# Patient Record
Sex: FEMALE | HISPANIC OR LATINO | URBAN - METROPOLITAN AREA
[De-identification: names, ages, dates, MRNs, and addresses within clinical notes are randomized per-mention and may not be internally consistent; named-entity substitution may affect disease eponyms.]

---

## 2024-08-08 ENCOUNTER — APPOINTMENT (RX ONLY)
Dept: URBAN - METROPOLITAN AREA CLINIC 82 | Facility: CLINIC | Age: 49
Setting detail: DERMATOLOGY
End: 2024-08-08

## 2024-08-08 DIAGNOSIS — L85.3 XEROSIS CUTIS: ICD-10-CM

## 2024-08-08 DIAGNOSIS — L29.8 OTHER PRURITUS: ICD-10-CM

## 2024-08-08 DIAGNOSIS — L29.89 OTHER PRURITUS: ICD-10-CM

## 2024-08-08 DIAGNOSIS — L81.6 OTHER DISORDERS OF DIMINISHED MELANIN FORMATION: ICD-10-CM

## 2024-08-08 DIAGNOSIS — L30.9 DERMATITIS, UNSPECIFIED: ICD-10-CM

## 2024-08-08 DIAGNOSIS — D485 NEOPLASM OF UNCERTAIN BEHAVIOR OF SKIN: ICD-10-CM

## 2024-08-08 PROBLEM — D48.5 NEOPLASM OF UNCERTAIN BEHAVIOR OF SKIN: Status: ACTIVE | Noted: 2024-08-08

## 2024-08-08 PROCEDURE — ? PRESCRIPTION

## 2024-08-08 PROCEDURE — ? TREATMENT REGIMEN

## 2024-08-08 PROCEDURE — ? DEFER

## 2024-08-08 PROCEDURE — 99243 OFF/OP CNSLTJ NEW/EST LOW 30: CPT

## 2024-08-08 PROCEDURE — ? COUNSELING

## 2024-08-08 RX ORDER — CLOBETASOL PROPIONATE 0.25 MG/G
CREAM TOPICAL
Qty: 1 | Refills: 0 | Status: ERX | COMMUNITY
Start: 2024-08-08

## 2024-08-08 RX ORDER — PIMECROLIMUS 10 MG/G
CREAM TOPICAL
Qty: 100 | Refills: 0 | Status: ERX | COMMUNITY
Start: 2024-08-08

## 2024-08-08 RX ADMIN — PIMECROLIMUS: 10 CREAM TOPICAL at 00:00

## 2024-08-08 RX ADMIN — CLOBETASOL PROPIONATE: 0.25 CREAM TOPICAL at 00:00

## 2024-08-08 NOTE — PROCEDURE: TREATMENT REGIMEN
Initiate Treatment: clobetasol 0.025 % topical cream \\nQuantity: 1.0 Unspecified\\nSig: Apply to affected areas on legs BID every other day\\n\\nElidel 1 % topical cream \\nQuantity: 100.0 g\\nSig: Apply to hypopigmentation on legs twice daily, every other day
Detail Level: Zone

## 2024-08-08 NOTE — PROCEDURE: DEFER
Size Of Lesion In Cm (Optional): 0
Other Procedure: UV LIGHT
Detail Level: Detailed
Introduction Text (Please End With A Colon): The following procedure was deferred:

## 2024-10-03 ENCOUNTER — RX ONLY (OUTPATIENT)
Age: 49
Setting detail: RX ONLY
End: 2024-10-03

## 2024-10-03 ENCOUNTER — APPOINTMENT (RX ONLY)
Dept: URBAN - METROPOLITAN AREA CLINIC 82 | Facility: CLINIC | Age: 49
Setting detail: DERMATOLOGY
End: 2024-10-03

## 2024-10-03 DIAGNOSIS — L81.6 OTHER DISORDERS OF DIMINISHED MELANIN FORMATION: ICD-10-CM

## 2024-10-03 DIAGNOSIS — L85.3 XEROSIS CUTIS: ICD-10-CM

## 2024-10-03 PROCEDURE — ? PRESCRIPTION

## 2024-10-03 PROCEDURE — 99213 OFFICE O/P EST LOW 20 MIN: CPT

## 2024-10-03 PROCEDURE — ? COUNSELING

## 2024-10-03 PROCEDURE — ? TREATMENT REGIMEN

## 2024-10-03 RX ORDER — PIMECROLIMUS 10 MG/G
CREAM TOPICAL
Qty: 100 | Refills: 0 | Status: ERX

## 2024-10-03 RX ORDER — BETAMETHASONE DIPROPIONATE 0.5 MG/G
CREAM TOPICAL
Qty: 45 | Refills: 0 | Status: ERX | COMMUNITY
Start: 2024-10-03

## 2024-10-03 RX ADMIN — BETAMETHASONE DIPROPIONATE: 0.5 CREAM TOPICAL at 00:00

## 2024-11-07 RX ADMIN — RUXOLITINIB: 15 CREAM TOPICAL at 00:00

## 2024-11-08 ENCOUNTER — APPOINTMENT (RX ONLY)
Dept: URBAN - METROPOLITAN AREA CLINIC 82 | Facility: CLINIC | Age: 49
Setting detail: DERMATOLOGY
End: 2024-11-08

## 2024-11-08 ENCOUNTER — RX ONLY (OUTPATIENT)
Age: 49
Setting detail: RX ONLY
End: 2024-11-08

## 2024-11-08 DIAGNOSIS — D485 NEOPLASM OF UNCERTAIN BEHAVIOR OF SKIN: ICD-10-CM

## 2024-11-08 DIAGNOSIS — L81.6 OTHER DISORDERS OF DIMINISHED MELANIN FORMATION: ICD-10-CM

## 2024-11-08 DIAGNOSIS — L20.89 OTHER ATOPIC DERMATITIS: ICD-10-CM

## 2024-11-08 PROBLEM — D48.5 NEOPLASM OF UNCERTAIN BEHAVIOR OF SKIN: Status: ACTIVE | Noted: 2024-11-08

## 2024-11-08 PROCEDURE — ? COUNSELING

## 2024-11-08 PROCEDURE — ? TREATMENT REGIMEN

## 2024-11-08 PROCEDURE — 99213 OFFICE O/P EST LOW 20 MIN: CPT

## 2024-11-08 PROCEDURE — ? PRESCRIPTION

## 2024-11-08 PROCEDURE — ? DEFER

## 2024-11-08 RX ORDER — RUXOLITINIB 15 MG/G
CREAM TOPICAL
Qty: 60 | Refills: 0 | Status: ERX | COMMUNITY
Start: 2024-11-07

## 2024-11-08 RX ORDER — BETAMETHASONE DIPROPIONATE 0.5 MG/G
CREAM TOPICAL
Qty: 45 | Refills: 0 | Status: ERX

## 2024-11-08 ASSESSMENT — BSA ECZEMA: % BODY COVERED IN ECZEMA: 10

## 2024-11-08 ASSESSMENT — SEVERITY ASSESSMENT 2020: SEVERITY 2020: MODERATE

## 2024-12-12 ENCOUNTER — RX ONLY (RX ONLY)
Age: 49
End: 2024-12-12

## 2024-12-12 ENCOUNTER — APPOINTMENT (OUTPATIENT)
Dept: URBAN - METROPOLITAN AREA CLINIC 82 | Facility: CLINIC | Age: 49
Setting detail: DERMATOLOGY
End: 2024-12-12

## 2024-12-12 DIAGNOSIS — D485 NEOPLASM OF UNCERTAIN BEHAVIOR OF SKIN: ICD-10-CM

## 2024-12-12 DIAGNOSIS — L20.89 OTHER ATOPIC DERMATITIS: ICD-10-CM

## 2024-12-12 DIAGNOSIS — L81.6 OTHER DISORDERS OF DIMINISHED MELANIN FORMATION: ICD-10-CM

## 2024-12-12 PROBLEM — D48.5 NEOPLASM OF UNCERTAIN BEHAVIOR OF SKIN: Status: ACTIVE | Noted: 2024-12-12

## 2024-12-12 PROCEDURE — ? BIOPSY BY PUNCH METHOD

## 2024-12-12 PROCEDURE — ? TREATMENT REGIMEN

## 2024-12-12 PROCEDURE — ? SEPARATE AND IDENTIFIABLE DOCUMENTATION

## 2024-12-12 PROCEDURE — 11104 PUNCH BX SKIN SINGLE LESION: CPT

## 2024-12-12 PROCEDURE — ? COUNSELING

## 2024-12-12 PROCEDURE — 99213 OFFICE O/P EST LOW 20 MIN: CPT | Mod: 25

## 2024-12-12 RX ORDER — RUXOLITINIB 15 MG/G
CREAM TOPICAL
Qty: 60 | Refills: 0 | Status: ERX

## 2024-12-12 ASSESSMENT — LOCATION ZONE DERM: LOCATION ZONE: FACE

## 2024-12-12 ASSESSMENT — LOCATION DETAILED DESCRIPTION DERM: LOCATION DETAILED: LEFT SUPERIOR CENTRAL MALAR CHEEK

## 2024-12-12 ASSESSMENT — LOCATION SIMPLE DESCRIPTION DERM: LOCATION SIMPLE: LEFT CHEEK

## 2024-12-30 ENCOUNTER — APPOINTMENT (OUTPATIENT)
Dept: URBAN - METROPOLITAN AREA CLINIC 82 | Facility: CLINIC | Age: 49
Setting detail: DERMATOLOGY
End: 2024-12-30

## 2024-12-30 DIAGNOSIS — L20.89 OTHER ATOPIC DERMATITIS: ICD-10-CM

## 2024-12-30 DIAGNOSIS — L72.8 OTHER FOLLICULAR CYSTS OF THE SKIN AND SUBCUTANEOUS TISSUE: ICD-10-CM

## 2024-12-30 DIAGNOSIS — L81.6 OTHER DISORDERS OF DIMINISHED MELANIN FORMATION: ICD-10-CM

## 2024-12-30 PROCEDURE — 99213 OFFICE O/P EST LOW 20 MIN: CPT

## 2024-12-30 PROCEDURE — ? TREATMENT REGIMEN

## 2024-12-30 PROCEDURE — ? PATHOLOGY DISCUSSION

## 2024-12-30 PROCEDURE — ? COUNSELING

## 2024-12-30 PROCEDURE — ? PRESCRIPTION

## 2024-12-30 RX ORDER — TAPINAROF 10 MG/1000MG
CREAM TOPICAL
Qty: 60 | Refills: 0 | Status: ERX | COMMUNITY
Start: 2024-12-30

## 2024-12-30 RX ADMIN — TAPINAROF: 10 CREAM TOPICAL at 00:00

## 2024-12-31 ENCOUNTER — HOSPITAL ENCOUNTER (OUTPATIENT)
Dept: HOSPITAL 104 - SCTC | Age: 49
Discharge: HOME | End: 2024-12-31
Payer: MEDICAID

## 2024-12-31 DIAGNOSIS — D05.02: Primary | ICD-10-CM

## 2024-12-31 DIAGNOSIS — Z79.810: ICD-10-CM

## 2024-12-31 PROCEDURE — 99212 OFFICE O/P EST SF 10 MIN: CPT

## 2024-12-31 PROCEDURE — G0463 HOSPITAL OUTPT CLINIC VISIT: HCPCS

## 2025-01-06 NOTE — CTCCONSULT_ITS
Patient: WALI ZHOU 
: 1975 
MR#: [id number] 
Page 2 of 3 
  
CONSULTATION NOTE 
  
DATE OF CONSULTATION: 2024 
  
NAME: WALI ZHOU 
MRN: [id number] 
ACCOUNT: [id number] 
: 1975 
AGE: 49 
  
REFERRING PHYSICIAN: Cristin Beebe MD 
PRIMARY PHYSICIAN: Cristin Beebe MD 
  
REASON FOR VISIT: 
LCIS 
  
ONCOLOGY HISTORY: 
DIAGNOSIS: 
2022 LCIS 
  
DATE OF DIAGNOSIS: 
2022 
  
STAGE/TNM: 
Stage 0 
  
TREATMENT HISTORY: 
  
Care?Plan 
Start?Date 
Cycle 
Day 
Intent 
 tamoxifen 
HISTORY OF PRESENT ILLNESS: 
49-year-old female with a history of LCIS.  Patient was diagnosed in  and has been on tamoxifen. 
 Patient is tolerating well and have no complaints.  Patient want to establish care with us. 
  
OTHER MEDICAL HISTORY/CONDITIONS: 
LCIS left breast - dx  
Hypothyroid 
Obesity 
Tubal ligation 
Cholecystectomy - 6 yrs ago  
Uterine ablation 
  
FAMILY HISTORY: 
Patient?denies?family?cancer?history. 
  
SOCIAL HISTORY: 
Occupational?History:?Unemployed 
Education?Level:?Completed High School 
Marital?Status:? 
Tobacco?Use:?Denies 
ETOH?Use:?Denies 
Drug?Note:?Denies 
Social?History?Note:?Denies 
  
GYN HISTORY: 
Menarche?-?Age:?11 
Menopause:?44 
Hormone?Use:?Birth?birth?med?x?9?yrs 
:?4 
Live?Births:?3 
Age?1st?Birth:?17 
Gynecological?Note:?1?miscarriage 
  
MEDICATIONS: 
1. betamethasone dipropionate - 0.05 %  Every other day 
2. levothyroxine - 175 mcg 1 tab Daily 
3. tamoxifen - 20 mg 1 tab Daily 
4. Wegovy - 0.25 mg/0.5 mL  Weekly 
Medications Last Reconciled by Sharyn Rai RN on 2024   
  
ALLERGIES: No Known Drug Allergies 
  
REVIEW OF SYSTEMS: 
A complete 14-point review of systems was performed and is negative except as noted in interval histo
ry. 
  
PHYSICAL EXAMINATION: 
VITAL SIGNS: Height?68?inches Weight?235?lbs 
PAIN: 0 - No pain 
ECOG Performance Status: 0 - Asymptomatic and fully active 
GENERAL APPEARANCE: Appears well, in no apparent distress, appropriately interactive. 
HEENT: Normocephalic, no temporal wasting, normal conjunctiva, no scleral icterus, normal hearing, li
ps without lesions, neck normal range of motion. 
CARDIOVASCULAR: Not assessed. 
PULMONARY: Normal respiratory effort, no respiratory distress or use of accessory muscles, speaking i
n full sentences, no tachypnea. 
EXTREMITIES: No pedal edema or cyanosis. 
SKIN: Normal skin appearance. 
NEUROLOGIC: Alert and oriented x4. 
PSHYCHIATRIC: Appropriate affect, mood normal, behavior normal, intact thought and speech. 
  
LABORATORY DATA: 
I have personally reviewed and interpreted each of the patient?s relevant lab tests, abnormal finding
s are below: 
  
Date 
  
  
ASSESSMENT/PLAN: 
LCIS 
Patient need to continue tamoxifen for 5 years 
Reviewed old pathology report 
Tolerating tamoxifen well 
Patient have regular menstruation 
Will continue tamoxifen 
Advised to take calcium and vitamin D3 
  
  
ORDERS: 
Tamoxifen CBC MP 
  
RETURN TO CLINIC: 
6 months 
  
BILLING AND COMPLIANCE: 
I reviewed external records from providers outside my specialty as summarized above. I spent a total 
of 50 minutes on this patient?s care on the day of their visit excluding time spent related to any bi
lled procedures.  This time includes time spent with the patient as well as time spent documenting in
 the medical record, reviewing patients records and tests, obtaining history, placing orders, communi
cating with other healthcare professionals, counseling the patient, family or caregiver, and/or care 
coordination for the diagnoses above. 
  
  
Electronically Signed by: Jose Manuel Hernandez MD 
D: 2025 T: 2:17 PM 
  
CC:   PCP: Cristin Beebe 
        Referring: Cristin Beebe 
  
This document was completed utilizing speech recognition software. Grammatical errors, random word in
sertions, pronoun errors, and incomplete sentences are an occasional consequence of this system due t
o software limitations, ambient noise, and hardware issues. Any formal questions or concerns about th
e content, text or information contained within the body of this dictation should be directly address
ed to the provider for clarification.

## 2025-01-28 ENCOUNTER — HOSPITAL ENCOUNTER (OUTPATIENT)
Dept: HOSPITAL 104 - SMRI | Age: 50
Discharge: HOME | End: 2025-01-28
Payer: MEDICAID

## 2025-01-28 ENCOUNTER — APPOINTMENT (OUTPATIENT)
Dept: URBAN - METROPOLITAN AREA CLINIC 82 | Facility: CLINIC | Age: 50
Setting detail: DERMATOLOGY
End: 2025-01-28

## 2025-01-28 ENCOUNTER — RX ONLY (RX ONLY)
Age: 50
End: 2025-01-28

## 2025-01-28 DIAGNOSIS — L20.89 OTHER ATOPIC DERMATITIS: ICD-10-CM

## 2025-01-28 DIAGNOSIS — L72.8 OTHER FOLLICULAR CYSTS OF THE SKIN AND SUBCUTANEOUS TISSUE: ICD-10-CM

## 2025-01-28 DIAGNOSIS — D05.02: Primary | ICD-10-CM

## 2025-01-28 DIAGNOSIS — L81.6 OTHER DISORDERS OF DIMINISHED MELANIN FORMATION: ICD-10-CM

## 2025-01-28 PROCEDURE — ? TREATMENT REGIMEN

## 2025-01-28 PROCEDURE — ? COUNSELING

## 2025-01-28 PROCEDURE — ? PATHOLOGY DISCUSSION

## 2025-01-28 PROCEDURE — 77049 MRI BREAST C-+ W/CAD BI: CPT

## 2025-01-28 PROCEDURE — 99213 OFFICE O/P EST LOW 20 MIN: CPT

## 2025-01-28 PROCEDURE — C8908 MRI W/O FOL W/CONT, BREAST,: HCPCS

## 2025-01-28 RX ORDER — RUXOLITINIB 15 MG/G
CREAM TOPICAL
Qty: 60 | Refills: 2 | Status: ERX

## 2025-01-28 NOTE — XR_ITS
Examination:  
   
MRI breasts without intravenous contrast  
MRI breasts with intravenous contrast  
   
Exam date and time: Generally 20/8/2025 1236 hours  
   
INDICATIONS: Diagnosis lobular carcinoma in situ left breast  
   
COMPARISON: Mammogram July 29, 2024  
   
FINDINGS:  
   
Scattered areas of fibroglandular density  
Minimal background breast enhancement  
10 mm circumscribed nodule outer right breast  
3 mm circumscribed nodule retroareolar region left breast  
Multiple right axillary lymph nodes, the largest 14 mm  
Oval enlarged left axillary lymph nodes, including 25 mm, 22 mm and 21 mm with  
rapid wash-in and rapid washout  
No chest wall lesion  
No nipple retraction or skin thickening  
   
IMPRESSION:  
   
BI-RADS Category 0: Incomplete: Need additional imaging evaluation  
   
Multiple abnormally prominent left axillary lymph nodes with rapid wash-in and  
washout on the kinetic post contrast imaging  
   
Recommend follow-up diagnostic mammography  
   
Recommend follow-up bilateral breast sonography to complete the workup

## 2025-02-10 ENCOUNTER — HOSPITAL ENCOUNTER (OUTPATIENT)
Age: 50
Discharge: HOME | End: 2025-02-10
Payer: MEDICAID

## 2025-02-10 DIAGNOSIS — D24.2: Primary | ICD-10-CM

## 2025-02-10 PROCEDURE — 76641 ULTRASOUND BREAST COMPLETE: CPT

## 2025-02-10 NOTE — XR_ITS
Examination:  
   
Breast ultrasound, unilateral, left complete  
   
Date and time of exam: February 10, 2025 0923 hours  
   
INDICATIONS: MRI breast January 28 2025 10 mm nodule outer right breast 3 mm  
circumscribed nodule retroareolar region left breast    
   
Technique:  
   
Real-time gray scale ultrasonographic imaging performed left breast including  
all 4 quadrants as well as nipple retroareolar and axillary region.  
   
Findings:  
   
3:00 cyst 6 x 3 mm  
Retroareolar cyst 4 x 3 mm  
No solid lymph nodes  
   
IMPRESSION:  
   
BI-RADS Category 2: Benign findings

## 2025-03-28 ENCOUNTER — HOSPITAL ENCOUNTER (OUTPATIENT)
Dept: HOSPITAL 104 - CDIM | Age: 50
Discharge: HOME | End: 2025-03-28
Payer: MEDICAID

## 2025-03-28 DIAGNOSIS — R92.322: Primary | ICD-10-CM

## 2025-03-28 DIAGNOSIS — D24.2: ICD-10-CM

## 2025-03-28 PROCEDURE — G0279 TOMOSYNTHESIS, MAMMO: HCPCS

## 2025-03-28 PROCEDURE — 77061 BREAST TOMOSYNTHESIS UNI: CPT

## 2025-03-28 PROCEDURE — 77065 DX MAMMO INCL CAD UNI: CPT

## 2025-03-28 NOTE — XR_ITS
Examination:   
   
Diagnostic digital mammography, unilateral,  left  
Computer aided detection  
3-D breast Tomosynthesis, unilateral  
   
Date and time of exam: March 28, 2025 at 1129 hours  
Comparison mammogram July 29, 2024, MRI breast January 28, 2025, left breast  
sonography February 10, 2025  
   
INDICATIONS: Patient states left breast mass close to the nipple note is  
beginning 3 years ago, MRI breast January 28, 2025 multiple prominent left  
axillary lymph nodes  
   
Technique:  
Nonmagnified MLO, CC views of the left breast have been obtained, reconstructed  
from 3-D Tomosynthesis images.  
R2 computer aided detection program utilized for evaluation of suspicious masses  
and/or abnormal calcifications.  
3-D Tomosynthesis images obtained.  
   
   
Findings:  
   
Scattered areas of fibroglandular density  
8 mm circumscribed nodule retroareolar region left breast   
   
Impression:  
   
BI-RADS category 2: Benign findings  
   
Recommend yearly follow-up mammography

## 2025-04-01 ENCOUNTER — RX ONLY (RX ONLY)
Age: 50
End: 2025-04-01

## 2025-04-01 ENCOUNTER — APPOINTMENT (OUTPATIENT)
Dept: URBAN - METROPOLITAN AREA CLINIC 82 | Facility: CLINIC | Age: 50
Setting detail: DERMATOLOGY
End: 2025-04-01

## 2025-04-01 DIAGNOSIS — L81.6 OTHER DISORDERS OF DIMINISHED MELANIN FORMATION: ICD-10-CM

## 2025-04-01 DIAGNOSIS — L20.89 OTHER ATOPIC DERMATITIS: ICD-10-CM

## 2025-04-01 PROCEDURE — 99213 OFFICE O/P EST LOW 20 MIN: CPT

## 2025-04-01 PROCEDURE — ? TREATMENT REGIMEN

## 2025-04-01 PROCEDURE — ? COUNSELING

## 2025-04-01 RX ORDER — BETAMETHASONE DIPROPIONATE 0.5 MG/G
CREAM TOPICAL
Qty: 45 | Refills: 1 | Status: ERX

## 2025-04-01 NOTE — PROCEDURE: TREATMENT REGIMEN
Continue Regimen: betamethasone dipropionate 0.05 % topical cream \\nApply to affected areas on body twice daily every other day
Detail Level: Zone
Initiate Treatment: Opzelura 1.5 % topical cream \\nApply to affected areas on body twice daily
Discontinue Regimen: Vtama 1 % topical cream \\nApply to affected areas on body once daily

## 2025-04-28 ENCOUNTER — APPOINTMENT (OUTPATIENT)
Dept: URBAN - METROPOLITAN AREA CLINIC 82 | Facility: CLINIC | Age: 50
Setting detail: DERMATOLOGY
End: 2025-04-28

## 2025-04-28 DIAGNOSIS — L81.6 OTHER DISORDERS OF DIMINISHED MELANIN FORMATION: ICD-10-CM

## 2025-04-28 DIAGNOSIS — L20.89 OTHER ATOPIC DERMATITIS: ICD-10-CM

## 2025-04-28 PROCEDURE — ? COUNSELING

## 2025-04-28 PROCEDURE — ? TREATMENT REGIMEN

## 2025-04-28 PROCEDURE — 99213 OFFICE O/P EST LOW 20 MIN: CPT

## 2025-07-25 ENCOUNTER — HOSPITAL ENCOUNTER (OUTPATIENT)
Age: 50
Discharge: HOME | End: 2025-07-25
Payer: MEDICAID

## 2025-07-25 DIAGNOSIS — D24.2: Primary | ICD-10-CM

## 2025-07-25 PROCEDURE — 76641 ULTRASOUND BREAST COMPLETE: CPT

## 2025-08-20 ENCOUNTER — APPOINTMENT (OUTPATIENT)
Dept: URBAN - METROPOLITAN AREA CLINIC 82 | Facility: CLINIC | Age: 50
Setting detail: DERMATOLOGY
End: 2025-08-20

## 2025-08-20 ENCOUNTER — RX ONLY (RX ONLY)
Age: 50
End: 2025-08-20

## 2025-08-20 DIAGNOSIS — D485 NEOPLASM OF UNCERTAIN BEHAVIOR OF SKIN: ICD-10-CM

## 2025-08-20 DIAGNOSIS — L20.89 OTHER ATOPIC DERMATITIS: ICD-10-CM

## 2025-08-20 DIAGNOSIS — L81.6 OTHER DISORDERS OF DIMINISHED MELANIN FORMATION: ICD-10-CM

## 2025-08-20 PROBLEM — D48.5 NEOPLASM OF UNCERTAIN BEHAVIOR OF SKIN: Status: ACTIVE | Noted: 2025-08-20

## 2025-08-20 PROCEDURE — ? DEFER

## 2025-08-20 PROCEDURE — ? COUNSELING

## 2025-08-20 PROCEDURE — ? TREATMENT REGIMEN

## 2025-08-20 RX ORDER — BETAMETHASONE DIPROPIONATE 0.5 MG/G
CREAM TOPICAL
Qty: 45 | Refills: 1 | Status: ERX

## 2025-08-20 RX ORDER — RUXOLITINIB 15 MG/G
CREAM TOPICAL
Qty: 60 | Refills: 2 | Status: ERX